# Patient Record
Sex: FEMALE | Race: WHITE | Employment: UNEMPLOYED | URBAN - METROPOLITAN AREA
[De-identification: names, ages, dates, MRNs, and addresses within clinical notes are randomized per-mention and may not be internally consistent; named-entity substitution may affect disease eponyms.]

---

## 2017-02-20 ENCOUNTER — OFFICE VISIT (OUTPATIENT)
Dept: ENDOCRINOLOGY | Age: 67
End: 2017-02-20

## 2017-02-20 VITALS
RESPIRATION RATE: 16 BRPM | BODY MASS INDEX: 13.21 KG/M2 | HEART RATE: 84 BPM | OXYGEN SATURATION: 100 % | WEIGHT: 82.2 LBS | DIASTOLIC BLOOD PRESSURE: 61 MMHG | SYSTOLIC BLOOD PRESSURE: 97 MMHG | HEIGHT: 66 IN

## 2017-02-20 DIAGNOSIS — E05.00 GRAVES DISEASE: ICD-10-CM

## 2017-02-20 DIAGNOSIS — E05.90 HYPERTHYROIDISM: Primary | ICD-10-CM

## 2017-02-20 PROCEDURE — 99213 OFFICE O/P EST LOW 20 MIN: CPT | Performed by: INTERNAL MEDICINE

## 2017-05-08 ENCOUNTER — OFFICE VISIT (OUTPATIENT)
Dept: ENDOCRINOLOGY | Age: 67
End: 2017-05-08

## 2017-05-08 VITALS
RESPIRATION RATE: 16 BRPM | SYSTOLIC BLOOD PRESSURE: 98 MMHG | BODY MASS INDEX: 13.89 KG/M2 | HEART RATE: 81 BPM | DIASTOLIC BLOOD PRESSURE: 61 MMHG | HEIGHT: 66 IN | WEIGHT: 86.4 LBS

## 2017-05-08 DIAGNOSIS — E05.90 HYPERTHYROIDISM: Primary | ICD-10-CM

## 2017-05-08 DIAGNOSIS — E05.00 GRAVES DISEASE: ICD-10-CM

## 2017-05-08 PROCEDURE — 99214 OFFICE O/P EST MOD 30 MIN: CPT | Performed by: INTERNAL MEDICINE

## 2017-05-08 RX ORDER — METHIMAZOLE 5 MG/1
TABLET ORAL
Qty: 30 TABLET | Refills: 4 | Status: SHIPPED | OUTPATIENT
Start: 2017-05-08 | End: 2017-05-11 | Stop reason: SDUPTHER

## 2017-05-11 RX ORDER — METHIMAZOLE 5 MG/1
TABLET ORAL
Qty: 90 TABLET | Refills: 1 | Status: SHIPPED | OUTPATIENT
Start: 2017-05-11 | End: 2017-05-17 | Stop reason: SDUPTHER

## 2017-05-17 RX ORDER — METHIMAZOLE 5 MG/1
TABLET ORAL
Qty: 90 TABLET | Refills: 1 | Status: SHIPPED | OUTPATIENT
Start: 2017-05-17 | End: 2018-09-22 | Stop reason: SDUPTHER

## 2017-08-09 ENCOUNTER — TELEPHONE (OUTPATIENT)
Dept: ENDOCRINOLOGY | Age: 67
End: 2017-08-09

## 2017-08-09 ENCOUNTER — OFFICE VISIT (OUTPATIENT)
Dept: ENDOCRINOLOGY | Age: 67
End: 2017-08-09

## 2017-08-09 VITALS
RESPIRATION RATE: 16 BRPM | DIASTOLIC BLOOD PRESSURE: 63 MMHG | BODY MASS INDEX: 15.04 KG/M2 | SYSTOLIC BLOOD PRESSURE: 92 MMHG | OXYGEN SATURATION: 96 % | HEIGHT: 66 IN | HEART RATE: 92 BPM | WEIGHT: 93.6 LBS

## 2017-08-09 DIAGNOSIS — E05.00 GRAVES DISEASE: ICD-10-CM

## 2017-08-09 DIAGNOSIS — E05.90 HYPERTHYROIDISM: Primary | ICD-10-CM

## 2017-08-09 PROCEDURE — 99214 OFFICE O/P EST MOD 30 MIN: CPT | Performed by: INTERNAL MEDICINE

## 2017-09-27 RX ORDER — METHIMAZOLE 5 MG/1
TABLET ORAL
Qty: 90 TABLET | Refills: 3 | Status: SHIPPED | OUTPATIENT
Start: 2017-09-27 | End: 2017-11-27 | Stop reason: SDUPTHER

## 2017-11-27 ENCOUNTER — OFFICE VISIT (OUTPATIENT)
Dept: ENDOCRINOLOGY | Age: 67
End: 2017-11-27

## 2017-11-27 VITALS
DIASTOLIC BLOOD PRESSURE: 62 MMHG | RESPIRATION RATE: 16 BRPM | BODY MASS INDEX: 18.99 KG/M2 | SYSTOLIC BLOOD PRESSURE: 102 MMHG | HEART RATE: 60 BPM | WEIGHT: 100.6 LBS | HEIGHT: 61 IN

## 2017-11-27 DIAGNOSIS — E05.00 GRAVES DISEASE: ICD-10-CM

## 2017-11-27 DIAGNOSIS — E05.90 HYPERTHYROIDISM: Primary | ICD-10-CM

## 2017-11-27 PROCEDURE — 99213 OFFICE O/P EST LOW 20 MIN: CPT | Performed by: INTERNAL MEDICINE

## 2018-03-26 ENCOUNTER — OFFICE VISIT (OUTPATIENT)
Dept: ENDOCRINOLOGY | Age: 68
End: 2018-03-26

## 2018-03-26 VITALS
DIASTOLIC BLOOD PRESSURE: 60 MMHG | BODY MASS INDEX: 20.5 KG/M2 | SYSTOLIC BLOOD PRESSURE: 100 MMHG | RESPIRATION RATE: 16 BRPM | HEART RATE: 62 BPM | HEIGHT: 61 IN | WEIGHT: 108.6 LBS

## 2018-03-26 DIAGNOSIS — E05.90 HYPERTHYROIDISM: Primary | ICD-10-CM

## 2018-03-26 DIAGNOSIS — E05.00 GRAVES DISEASE: ICD-10-CM

## 2018-03-26 PROCEDURE — 99213 OFFICE O/P EST LOW 20 MIN: CPT | Performed by: INTERNAL MEDICINE

## 2018-03-26 NOTE — PROGRESS NOTES
03/26/18 108 lb 9.6 oz (49.3 kg)   11/27/17 100 lb 9.6 oz (45.6 kg)   08/09/17 93 lb 9.6 oz (42.5 kg)       Constitutional: Well-developed, appears stated age, cooperative, in no acute distress, thin,   H/E/N/M/T:atraumatic, normocephalic, external ears, nose, lips normal without lesions  Eyes: Lids, lashes, conjunctivae and sclerae normal, No proptosis  Hand muscle wasting, deformity  Thyroid: gland size normal  Skin: No obvious rashes or lesions present. Psychiatric: Judgement and Insight:  judgement and insight appear normal  Neuro: Normal without focal findings, speech is normal normal, speech is spontaneous    Lab Review  Lab Results   Component Value Date    TSH 2.73 03/20/2018     No results found for: FREET4       24-hour radioactive iodine uptake is 25.8% which is towards upper  range of normal (normal 10-30/35%). Asymmetric enlargement right lobe of thyroid approximately 6-7 cm  in length. Asymmetric increased activity in right lobe of thyroid. Only faint  activity in thyroid isthmus and inferior aspect left lobe of  Thyroid. 2/16 Thyroid US   1. Solitary poorly defined nonspecific hypoechoic nodule in the  anterior superior aspect of the right thyroid lobe measuring 8 x 7  x 5 mm. 2. No evidence of additional thyroid nodules. 3. No evidence of thyromegaly. Reviewed ultrasound , nodule 8mm seen is ill defined, gland is heterogenous. Findings suggest Graves disease given + Ab, although scan showed uptake on right mainly, no nodule seen on USG, rare presentation for Grave's      Assessment: 1. Graves Disease: On tapazole 5 mg QOD , free level normal,TSH normal, may hold tapazole and assess for remission, discussed MURPHY. Given stable currently, continue same dose. TSI negative  3. Connective tissue disorder  4. Anxiety/depression  5. OCD  6. DDD  7. Thyroid Nodule: ill defined  8. Check cortisol given low BMI , difficulty gaining    Plan:     1. Tapazole  5mg QOD  2. TFT in 4 months

## 2018-07-31 ENCOUNTER — TELEPHONE (OUTPATIENT)
Dept: ENDOCRINOLOGY | Age: 68
End: 2018-07-31

## 2018-09-24 RX ORDER — METHIMAZOLE 5 MG/1
TABLET ORAL
Qty: 90 TABLET | Refills: 3 | Status: SHIPPED | OUTPATIENT
Start: 2018-09-24 | End: 2019-02-04

## 2018-10-08 ENCOUNTER — OFFICE VISIT (OUTPATIENT)
Dept: ENDOCRINOLOGY | Age: 68
End: 2018-10-08
Payer: COMMERCIAL

## 2018-10-08 VITALS
HEIGHT: 61 IN | WEIGHT: 108.2 LBS | OXYGEN SATURATION: 99 % | DIASTOLIC BLOOD PRESSURE: 59 MMHG | RESPIRATION RATE: 16 BRPM | BODY MASS INDEX: 20.43 KG/M2 | HEART RATE: 89 BPM | SYSTOLIC BLOOD PRESSURE: 90 MMHG

## 2018-10-08 DIAGNOSIS — E05.00 GRAVES DISEASE: ICD-10-CM

## 2018-10-08 DIAGNOSIS — E05.90 HYPERTHYROIDISM: Primary | ICD-10-CM

## 2018-10-08 PROCEDURE — 99213 OFFICE O/P EST LOW 20 MIN: CPT | Performed by: INTERNAL MEDICINE

## 2018-11-29 ENCOUNTER — APPOINTMENT (RX ONLY)
Dept: URBAN - METROPOLITAN AREA CLINIC 170 | Facility: CLINIC | Age: 68
Setting detail: DERMATOLOGY
End: 2018-11-29

## 2018-11-29 DIAGNOSIS — L81.4 OTHER MELANIN HYPERPIGMENTATION: ICD-10-CM

## 2018-11-29 DIAGNOSIS — D22 MELANOCYTIC NEVI: ICD-10-CM

## 2018-11-29 DIAGNOSIS — D18.0 HEMANGIOMA: ICD-10-CM

## 2018-11-29 DIAGNOSIS — L82.1 OTHER SEBORRHEIC KERATOSIS: ICD-10-CM

## 2018-11-29 PROBLEM — M32.10 SYSTEMIC LUPUS ERYTHEMATOSUS, ORGAN OR SYSTEM INVOLVEMENT UNSPECIFIED: Status: ACTIVE | Noted: 2018-11-29

## 2018-11-29 PROBLEM — D22.39 MELANOCYTIC NEVI OF OTHER PARTS OF FACE: Status: ACTIVE | Noted: 2018-11-29

## 2018-11-29 PROBLEM — D18.01 HEMANGIOMA OF SKIN AND SUBCUTANEOUS TISSUE: Status: ACTIVE | Noted: 2018-11-29

## 2018-11-29 PROBLEM — L29.8 OTHER PRURITUS: Status: ACTIVE | Noted: 2018-11-29

## 2018-11-29 PROBLEM — D22.5 MELANOCYTIC NEVI OF TRUNK: Status: ACTIVE | Noted: 2018-11-29

## 2018-11-29 PROBLEM — L85.3 XEROSIS CUTIS: Status: ACTIVE | Noted: 2018-11-29

## 2018-11-29 PROBLEM — E05.90 THYROTOXICOSIS, UNSPECIFIED WITHOUT THYROTOXIC CRISIS OR STORM: Status: ACTIVE | Noted: 2018-11-29

## 2018-11-29 PROCEDURE — ? COUNSELING

## 2018-11-29 PROCEDURE — 99202 OFFICE O/P NEW SF 15 MIN: CPT

## 2018-11-29 ASSESSMENT — LOCATION DETAILED DESCRIPTION DERM
LOCATION DETAILED: STERNAL NOTCH
LOCATION DETAILED: LEFT MEDIAL BREAST 10-11:00 REGION
LOCATION DETAILED: MIDDLE STERNUM
LOCATION DETAILED: LEFT FOREHEAD
LOCATION DETAILED: RIGHT LATERAL SUPERIOR CHEST

## 2018-11-29 ASSESSMENT — LOCATION ZONE DERM
LOCATION ZONE: FACE
LOCATION ZONE: TRUNK

## 2018-11-29 ASSESSMENT — LOCATION SIMPLE DESCRIPTION DERM
LOCATION SIMPLE: CHEST
LOCATION SIMPLE: LEFT BREAST
LOCATION SIMPLE: LEFT FOREHEAD

## 2018-11-29 NOTE — HPI: EVALUATION OF SKIN LESION(S)
Hpi Title: Evaluation of Skin Lesions
How Severe Are Your Spot(S)?: mild
Have Your Spot(S) Been Treated In The Past?: has not been treated
Additional History: Left lower leg has two scaly growths for about one year. Body lotion is not helping. \\nDark mole on left upper forehead noticed approximately 3 months. No changes.

## 2019-01-23 ENCOUNTER — HOSPITAL ENCOUNTER (OUTPATIENT)
Dept: ULTRASOUND IMAGING | Age: 69
Discharge: HOME | End: 2019-01-23
Payer: COMMERCIAL

## 2019-01-23 DIAGNOSIS — E05.90 HYPERTHYROIDISM: ICD-10-CM

## 2019-01-23 DIAGNOSIS — E05.00 GRAVES DISEASE: ICD-10-CM

## 2019-01-23 PROCEDURE — 76536 US EXAM OF HEAD AND NECK: CPT

## 2019-02-04 ENCOUNTER — OFFICE VISIT (OUTPATIENT)
Dept: ENDOCRINOLOGY | Age: 69
End: 2019-02-04
Payer: COMMERCIAL

## 2019-02-04 VITALS
WEIGHT: 102 LBS | HEIGHT: 61 IN | DIASTOLIC BLOOD PRESSURE: 61 MMHG | BODY MASS INDEX: 19.26 KG/M2 | SYSTOLIC BLOOD PRESSURE: 98 MMHG | RESPIRATION RATE: 16 BRPM | HEART RATE: 81 BPM

## 2019-02-04 DIAGNOSIS — E05.00 GRAVES DISEASE: ICD-10-CM

## 2019-02-04 DIAGNOSIS — E05.90 HYPERTHYROIDISM: Primary | ICD-10-CM

## 2019-02-04 DIAGNOSIS — E04.1 THYROID NODULE: ICD-10-CM

## 2019-02-04 PROCEDURE — 99214 OFFICE O/P EST MOD 30 MIN: CPT | Performed by: INTERNAL MEDICINE

## 2019-02-04 RX ORDER — ERGOCALCIFEROL 1.25 MG/1
CAPSULE ORAL
COMMUNITY
Start: 2019-01-18 | End: 2021-03-19 | Stop reason: ALTCHOICE

## 2019-02-04 RX ORDER — RISEDRONATE SODIUM 35 MG/1
TABLET, FILM COATED ORAL
COMMUNITY
Start: 2019-01-18 | End: 2019-06-05

## 2019-06-05 ENCOUNTER — OFFICE VISIT (OUTPATIENT)
Dept: ENDOCRINOLOGY | Age: 69
End: 2019-06-05
Payer: COMMERCIAL

## 2019-06-05 VITALS
HEIGHT: 61 IN | HEART RATE: 77 BPM | BODY MASS INDEX: 18.43 KG/M2 | RESPIRATION RATE: 16 BRPM | WEIGHT: 97.6 LBS | OXYGEN SATURATION: 100 % | DIASTOLIC BLOOD PRESSURE: 59 MMHG | SYSTOLIC BLOOD PRESSURE: 93 MMHG

## 2019-06-05 DIAGNOSIS — E05.90 HYPERTHYROIDISM: Primary | ICD-10-CM

## 2019-06-05 PROCEDURE — 99213 OFFICE O/P EST LOW 20 MIN: CPT | Performed by: INTERNAL MEDICINE

## 2019-06-05 NOTE — PROGRESS NOTES
Subjective:      64 y/o WF who is here for f/u for Graves disease. Interval History: none  Off tapazole    4/16: TSH 0.06 FT4 0.8 FT3 2.5    2/16 TSH <0.01 FT4 2.1 FT3 5.3 TBII 41 H     12/15 TSH 0.009 FT4 2.49  TPO Ab 85    3/14 TSH 1.42  9/14 TSH 1.92    Initial complaints: fatigue, muscle, joint pain, anxiety, no tremors,25lb weight loss over 3 years, she had decreased appetite  History of obstructive symptoms:  difficulty swallowing No, changes in voice/hoarseness  No.    History of radiation to patient's neck:   No  Recent iodine exposure:  10/15  Family history includes no thyroid abnormalities. Family history of thyroid cancer:  No    7/16 TSH 4.75 FT4 0.9 FT3 2.4 tapazole 5mg  10/16 TSH 0.35 FT4 1.0 FT3 2.9    5mg QOD  2/17 TSH 16 FT4 0.8 FT3 2.3    Now off tapazole    5/17 TSH 0.05 FT4 1.3 FT3 3.4  8/17 TSH 6.48 FT4 0.8 T3 pending Cortisol 14.8  Tapazole 5mg daily  11/17 TSH 3  FT4 1.0 FT3 2.6 Tapazole 5mg QOD  3/18 TSH 2.73 Free level normal   TSI negative  7/18 TSH 3.24  9/18  TSH 2.86   1/19 TSH 3.58  6.19 TSH 2.71    USG showed a nodule    USG 2/16  Solitary poorly defined nonspecific hypoechoic nodule in the   anterior superior aspect of the right thyroid lobe measuring 8 x 7   x 5 mm.      New PCP: Beth Jimenez    FH: + h/o autoimmune disease in family    Past Medical History:   Diagnosis Date    Autoimmune disease ([address]     Herniated disc     IBS (irritable bowel syndrome)     Lupus      Past Surgical History:   Procedure Laterality Date    SMALL INTESTINE SURGERY       Current Outpatient Medications   Medication Sig Dispense Refill    risedronate (ACTONEL) 35 MG tablet       vitamin D (ERGOCALCIFEROL) 46117 units CAPS capsule       venlafaxine (EFFEXOR XR) 37.5 MG extended release capsule Take 37.5 mg by mouth daily      ARIPiprazole (ABILIFY) 10 MG tablet Take 10 mg by mouth daily      diazepam (VALIUM) 5 MG tablet Take 5 mg by mouth 1/2 tablet twice daily       No current facility-administered medications for this visit. Review of Systems  Scanned, reviewed  5lb loss     Objective:      LMP  (LMP Unknown)   Wt Readings from Last 3 Encounters:   02/04/19 102 lb (46.3 kg)   10/08/18 108 lb 3.2 oz (49.1 kg)   03/26/18 108 lb 9.6 oz (49.3 kg)       Constitutional: Well-developed, appears stated age, cooperative, in no acute distress, thin,   H/E/N/M/T:atraumatic, normocephalic, external ears, nose, lips normal without lesions  Eyes: Lids, lashes, conjunctivae and sclerae normal, No proptosis  Hand muscle wasting, deformity  Thyroid: gland size normal  Skin: No obvious rashes or lesions present. Psychiatric: Judgement and Insight:  judgement and insight appear normal  Neuro: Normal without focal findings, speech is normal normal, speech is spontaneous    Lab Review  Lab Results   Component Value Date    TSH 3.24 07/25/2018     No results found for: FREET4       24-hour radioactive iodine uptake is 25.8% which is towards upper  range of normal (normal 10-30/35%). Asymmetric enlargement right lobe of thyroid approximately 6-7 cm  in length. Asymmetric increased activity in right lobe of thyroid. Only faint  activity in thyroid isthmus and inferior aspect left lobe of  Thyroid. 2/16 Thyroid US   1. Solitary poorly defined nonspecific hypoechoic nodule in the  anterior superior aspect of the right thyroid lobe measuring 8 x 7  x 5 mm. 2. No evidence of additional thyroid nodules. 3. No evidence of thyromegaly. Reviewed ultrasound , nodule 8mm seen is ill defined, gland is heterogenous. Findings suggest Graves disease given + Ab, although scan showed uptake on right mainly, no nodule seen on USG, rare presentation for Grave's      Assessment: 1. Graves Disease:  Off tapazole, TSH normal, in  remission, will monitor  3. Connective tissue disorder  4. Anxiety/depression  5. OCD  6. DDD  7. Thyroid Nodule: ill defined, none seen on repeat USG    Plan:     1. Hold tapazole  2. TFT in 6 months

## 2020-01-07 ENCOUNTER — TELEPHONE (OUTPATIENT)
Dept: ENDOCRINOLOGY | Age: 70
End: 2020-01-07

## 2020-02-17 ENCOUNTER — TELEPHONE (OUTPATIENT)
Dept: ENDOCRINOLOGY | Age: 70
End: 2020-02-17

## 2020-02-17 NOTE — TELEPHONE ENCOUNTER
Patient's  would like a return phone call from Dr Lilia Hyatt.  Patient had some test done and patient's  is wondering if Dr. Lilia Hyatt ordered TSH sensitivity test. Please return call

## 2020-02-17 NOTE — TELEPHONE ENCOUNTER
Called patient, spoke to , clarified TSH high sensitive vs TSH test as will provide same info in her case. Discussed that  usually reviews results and cancels appointments. Advised in order to provide complete and adequate evaluation, need to see patient. If they would prefer, can see PCP and have labs done every 3 months and see me yearly or prn. They will follow with PCP and see me prn.

## 2020-02-17 NOTE — TELEPHONE ENCOUNTER
Please advise  that the TSH was resulted. We can give results and mail if needed, will discuss at visit. LOV 6/19.

## 2020-08-20 ENCOUNTER — TELEPHONE (OUTPATIENT)
Dept: ENDOCRINOLOGY | Age: 70
End: 2020-08-20

## 2020-08-25 ENCOUNTER — TELEPHONE (OUTPATIENT)
Dept: ENDOCRINOLOGY | Age: 70
End: 2020-08-25

## 2020-12-04 ENCOUNTER — PATIENT MESSAGE (OUTPATIENT)
Dept: ENDOCRINOLOGY | Age: 70
End: 2020-12-04

## 2020-12-07 RX ORDER — METHIMAZOLE 5 MG/1
TABLET ORAL
Qty: 90 TABLET | Refills: 1 | Status: SHIPPED | OUTPATIENT
Start: 2020-12-07 | End: 2021-07-02 | Stop reason: SDUPTHER

## 2020-12-28 ENCOUNTER — APPOINTMENT (RX ONLY)
Dept: URBAN - METROPOLITAN AREA CLINIC 170 | Facility: CLINIC | Age: 70
Setting detail: DERMATOLOGY
End: 2020-12-28

## 2020-12-28 DIAGNOSIS — D18.0 HEMANGIOMA: ICD-10-CM

## 2020-12-28 DIAGNOSIS — L85.3 XEROSIS CUTIS: ICD-10-CM

## 2020-12-28 DIAGNOSIS — L81.4 OTHER MELANIN HYPERPIGMENTATION: ICD-10-CM

## 2020-12-28 DIAGNOSIS — L82.1 OTHER SEBORRHEIC KERATOSIS: ICD-10-CM

## 2020-12-28 DIAGNOSIS — D22 MELANOCYTIC NEVI: ICD-10-CM

## 2020-12-28 PROBLEM — D22.5 MELANOCYTIC NEVI OF TRUNK: Status: ACTIVE | Noted: 2020-12-28

## 2020-12-28 PROBLEM — D18.01 HEMANGIOMA OF SKIN AND SUBCUTANEOUS TISSUE: Status: ACTIVE | Noted: 2020-12-28

## 2020-12-28 PROCEDURE — ? FULL BODY SKIN EXAM

## 2020-12-28 PROCEDURE — 99213 OFFICE O/P EST LOW 20 MIN: CPT

## 2020-12-28 PROCEDURE — ? ADDITIONAL NOTES

## 2020-12-28 PROCEDURE — ? COUNSELING

## 2020-12-28 ASSESSMENT — LOCATION SIMPLE DESCRIPTION DERM
LOCATION SIMPLE: LEFT PRETIBIAL REGION
LOCATION SIMPLE: RIGHT PRETIBIAL REGION
LOCATION SIMPLE: CHEST
LOCATION SIMPLE: LEFT BREAST

## 2020-12-28 ASSESSMENT — LOCATION DETAILED DESCRIPTION DERM
LOCATION DETAILED: STERNAL NOTCH
LOCATION DETAILED: RIGHT PROXIMAL PRETIBIAL REGION
LOCATION DETAILED: LEFT MEDIAL BREAST 10-11:00 REGION
LOCATION DETAILED: MIDDLE STERNUM
LOCATION DETAILED: LEFT PROXIMAL PRETIBIAL REGION
LOCATION DETAILED: RIGHT LATERAL SUPERIOR CHEST

## 2020-12-28 ASSESSMENT — LOCATION ZONE DERM
LOCATION ZONE: LEG
LOCATION ZONE: TRUNK

## 2020-12-28 NOTE — HPI: EVALUATION OF SKIN LESION(S)
Dr Robin Gongora at bedside for pt evaluation         Lucretia George, RN  09/23/19 [id number]
What Type Of Note Output Would You Prefer (Optional)?: Bullet Format
Hpi Title: Evaluation of Skin Lesions
How Severe Are Your Spot(S)?: mild

## 2020-12-28 NOTE — PROCEDURE: MIPS QUALITY
Detail Level: Detailed
Quality 47: Advance Care Plan: Advance Care Planning discussed and documented in the medical record; patient did not wish or was not able to name a surrogate decision maker or provide an advance care plan.
Quality 110: Preventive Care And Screening: Influenza Immunization: Influenza Immunization Administered during Influenza season
Quality 226: Preventive Care And Screening: Tobacco Use: Screening And Cessation Intervention: Patient screened for tobacco use and is an ex/non-smoker
Quality 130: Documentation Of Current Medications In The Medical Record: Current Medications Documented
Quality 431: Preventive Care And Screening: Unhealthy Alcohol Use - Screening: Patient screened for unhealthy alcohol use using a single question and scores less than 2 times per year
Quality 111:Pneumonia Vaccination Status For Older Adults: Pneumococcal Vaccination Previously Received

## 2020-12-28 NOTE — HPI: RASH
What Type Of Note Output Would You Prefer (Optional)?: Bullet Format
How Severe Is Your Rash?: mild
Is This A New Presentation, Or A Follow-Up?: Rash
Additional History: Rash is better, no treatment has been done.

## 2021-03-19 ENCOUNTER — VIRTUAL VISIT (OUTPATIENT)
Dept: ENDOCRINOLOGY | Age: 71
End: 2021-03-19
Payer: MEDICARE

## 2021-03-19 DIAGNOSIS — R63.4 WEIGHT LOSS: ICD-10-CM

## 2021-03-19 DIAGNOSIS — E05.00 GRAVES DISEASE: Primary | ICD-10-CM

## 2021-03-19 PROCEDURE — G8427 DOCREV CUR MEDS BY ELIG CLIN: HCPCS | Performed by: INTERNAL MEDICINE

## 2021-03-19 PROCEDURE — G8400 PT W/DXA NO RESULTS DOC: HCPCS | Performed by: INTERNAL MEDICINE

## 2021-03-19 PROCEDURE — 99214 OFFICE O/P EST MOD 30 MIN: CPT | Performed by: INTERNAL MEDICINE

## 2021-03-19 PROCEDURE — 3017F COLORECTAL CA SCREEN DOC REV: CPT | Performed by: INTERNAL MEDICINE

## 2021-03-19 PROCEDURE — 1090F PRES/ABSN URINE INCON ASSESS: CPT | Performed by: INTERNAL MEDICINE

## 2021-03-19 PROCEDURE — 4040F PNEUMOC VAC/ADMIN/RCVD: CPT | Performed by: INTERNAL MEDICINE

## 2021-03-19 PROCEDURE — 1123F ACP DISCUSS/DSCN MKR DOCD: CPT | Performed by: INTERNAL MEDICINE

## 2021-03-19 NOTE — PROGRESS NOTES
Subjective:      74 y/o WF who is here for f/u for Graves disease. Pursuant to the emergency declaration under the [facility][note 3][facility], [facility] waiver authority and the [employer] and [employer] Act, this Virtual  Visit was conducted, with patient's consent, to reduce the patient's risk of exposure to COVID-19 and provide continuity of care for an established patient. Patient was at home. Provider was at home.  also at visit  Services were provided through a video synchronous discussion virtually to substitute for in-person clinic visit. Interval History:   She is taking tapazole QOD  Weight increasing    4/16: TSH 0.06 FT4 0.8 FT3 2.5    2/16 TSH <0.01 FT4 2.1 FT3 5.3 TBII 41 H     12/15 TSH 0.009 FT4 2.49  TPO Ab 85    3/14 TSH 1.42  9/14 TSH 1.92    Initial complaints: fatigue, muscle, joint pain, anxiety, no tremors,25lb weight loss over 3 years, she had decreased appetite  History of obstructive symptoms:  difficulty swallowing No, changes in voice/hoarseness  No.    History of radiation to patient's neck:   No  Recent iodine exposure:  10/15  Family history includes no thyroid abnormalities. Family history of thyroid cancer:  No    7/16 TSH 4.75 FT4 0.9 FT3 2.4 tapazole 5mg  10/16 TSH 0.35 FT4 1.0 FT3 2.9    5mg QOD  2/17 TSH 16 FT4 0.8 FT3 2.3    Now off tapazole    5/17 TSH 0.05 FT4 1.3 FT3 3.4  8/17 TSH 6.48 FT4 0.8 T3 pending Cortisol 14.8  Tapazole 5mg daily  11/17 TSH 3  FT4 1.0 FT3 2.6 Tapazole 5mg QOD  3/18 TSH 2.73 Free level normal   TSI negative  7/18 TSH 3.24  9/18  TSH 2.86   1/19 TSH 3.58  6.19 TSH 2.71  8/20 TSH 0.81 FT4 0.98  12/20 TSH 0.02  2/21 TSH 13.33  Tapazole 5mg daily    USG showed a nodule    USG 2/16  Solitary poorly defined nonspecific hypoechoic nodule in the   anterior superior aspect of the right thyroid lobe measuring 8 x 7   x 5 mm.      New PCP: Sienna Lazcano    She had weight loss  Now improving  Gaining 1.5 lb per week    FH: + h/o autoimmune disease in family    Past Medical History:   Diagnosis Date    [facility] [address] [address]     Herniated disc     IBS (irritable bowel syndrome)     Lupus (HCC)      Past Surgical History:   Procedure Laterality Date    SMALL INTESTINE SURGERY       Current Outpatient Medications   Medication Sig Dispense Refill    methIMAzole (TAPAZOLE) 5 MG tablet TAKE 1 TABLET DAILY 90 tablet 1    venlafaxine (EFFEXOR XR) 37.5 MG extended release capsule Take 37.5 mg by mouth daily      diazepam (VALIUM) 5 MG tablet Take 5 mg by mouth 1/2 tablet twice daily       No current facility-administered medications for this visit. Review of Systems  Constitutional: + for weight gain and malaise/fatigue. Negative for fever and chills. HENT: Negative for hearing loss, ear pain, nosebleeds, neck pain and tinnitus. Eyes: Negative for blurred vision. Negative for double vision, photophobia and pain. Respiratory: Negative for cough and sputum production. Cardiovascular: Negative for chest pain, palpitations and leg swelling. Gastrointestinal: Negative for nausea, vomiting and abdominal pain. Genitourinary: Negative for dysuria, urgency and frequency. Musculoskeletal: Negative for back pain. No joint pain +chronic pain  Skin: Negative for itching and rash. Neurological: Negative for dizziness. Negative for tingling, tremors, focal weakness and headaches. Endo/Heme/Allergies: see HPI  Psychiatric/Behavioral: Negative for depression and substance abuse.             PHYSICAL EXAMINATION:  [ INSTRUCTIONS:  \"[x]\" Indicates a positive item  \"[]\" Indicates a negative item  -- DELETE ALL ITEMS NOT EXAMINED]  Vital Signs: (As obtained by patient/caregiver or practitioner observation)    Blood pressure-  Heart rate-    Respiratory rate- 14   Temperature-  Pulse oximetry-     Constitutional Appears well-developed and well-nourished No apparent distress        Mental status  Alert and awake  Oriented to person/place/time  Able to follow commands      Eyes:  EOM    [x]  Normal    Sclera  [x]  Normal           Discharge [x]  None visible      HENT:   [x] Normocephalic, atraumatic. [x] Mouth/Throat: Mucous membranes are moist.     External Ears [x] Normal  no discharge    Neck: [x] No visualized mass  no swelling    Pulmonary/Chest: [x] Respiratory effort normal.  [x] No visualized signs of difficulty breathing or respiratory distress             Musculoskeletal:   [x] Normal gait with no signs of ataxia         [x] Normal range of motion of neck          Head and neck stable, appears normal ROM, strength good    Neurological:        [x] No Facial Asymmetry (Cranial nerve 7 motor function) (limited exam to video visit)          [x] No gaze palsy                Skin:        [x] No significant exanthematous lesions or discoloration noted on facial skin                 Psychiatric:       [x] Normal Affect [x] No Hallucinations            Other pertinent observable physical exam findings-     Due to this being a TeleHealth encounter, evaluation of the following organ systems is limited: Vitals/Constitutional/EENT/Resp/CV/GI//MS/Neuro/Skin/Heme-Lymph-Imm. Services were provided through a video synchronous discussion virtually to substitute for in-person clinic visit. Lab Review  Lab Results   Component Value Date    TSH 3.800 02/10/2020     No results found for: FREET4       24-hour radioactive iodine uptake is 25.8% which is towards upper  range of normal (normal 10-30/35%). Asymmetric enlargement right lobe of thyroid approximately 6-7 cm  in length. Asymmetric increased activity in right lobe of thyroid. Only faint  activity in thyroid isthmus and inferior aspect left lobe of  Thyroid. 2/16 Thyroid US   1. Solitary poorly defined nonspecific hypoechoic nodule in the  anterior superior aspect of the right thyroid lobe measuring 8 x 7  x 5 mm.   2. No evidence of additional thyroid nodules. 3. No evidence of thyromegaly. Reviewed ultrasound , nodule 8mm seen is ill defined, gland is heterogenous. Findings suggest Graves disease given + Ab, although scan showed uptake on right mainly, no nodule seen on USG, rare presentation for Grave's      Assessment: 1. Graves Disease: On low dose tapazole, gaining weight now. Will check labs in 6 weeks, f/u in 3 months. Discussed MURPHY, she would like to avoid  3. Connective tissue disorder  4. Anxiety/depression  5. OCD  6. DDD  7. Thyroid Nodule: ill defined, none seen on repeat USG  8. Weight loss: Cortisol has been checked in the past normal, recheck    Plan:     1. Tapazole 5mg QOD  2.  TFT in 6 weeks

## 2021-05-03 ENCOUNTER — APPOINTMENT (RX ONLY)
Dept: URBAN - METROPOLITAN AREA CLINIC 170 | Facility: CLINIC | Age: 71
Setting detail: DERMATOLOGY
End: 2021-05-03

## 2021-05-03 DIAGNOSIS — B07.8 OTHER VIRAL WARTS: ICD-10-CM

## 2021-05-03 PROBLEM — D48.5 NEOPLASM OF UNCERTAIN BEHAVIOR OF SKIN: Status: ACTIVE | Noted: 2021-05-03

## 2021-05-03 PROCEDURE — ? EDUCATIONAL RESOURCES PROVIDED

## 2021-05-03 PROCEDURE — 11102 TANGNTL BX SKIN SINGLE LES: CPT

## 2021-05-03 PROCEDURE — ? BIOPSY BY SHAVE METHOD

## 2021-05-03 PROCEDURE — ? ADDITIONAL NOTES

## 2021-05-03 PROCEDURE — ? IN-HOUSE DISPENSING PHARMACY

## 2021-05-03 ASSESSMENT — LOCATION ZONE DERM: LOCATION ZONE: TOE

## 2021-05-03 ASSESSMENT — LOCATION SIMPLE DESCRIPTION DERM: LOCATION SIMPLE: PLANTAR SURFACE OF LEFT 1ST TOE

## 2021-05-03 ASSESSMENT — LOCATION DETAILED DESCRIPTION DERM: LOCATION DETAILED: LEFT MEDIAL PLANTAR 1ST TOE

## 2021-05-03 NOTE — HPI: SKIN LESION
What Type Of Note Output Would You Prefer (Optional)?: Bullet Format
How Severe Is Your Skin Lesion?: severe
Has Your Skin Lesion Been Treated?: been treated
Is This A New Presentation, Or A Follow-Up?: Growth
Additional History: Warty like growth

## 2021-05-03 NOTE — PROCEDURE: IN-HOUSE DISPENSING PHARMACY
Product 40 Price/Unit (In Dollars): 0
Product 51 Unit Type: mg
Detail Level: Zone
Send Charges To Patient Encounter: Yes
Product 2 Amount/Unit (Numbers Only): 1
Product 1 Price/Unit (In Dollars): 65.00
Product 1 Refills: 2
Name Of Product 2: Wartstick
Product 2 Application Directions: QD
Product 2 Unit Type: bottle(s)
Name Of Product 1: Tretinoin

## 2021-07-02 ENCOUNTER — VIRTUAL VISIT (OUTPATIENT)
Dept: ENDOCRINOLOGY | Age: 71
End: 2021-07-02
Payer: MEDICARE

## 2021-07-02 DIAGNOSIS — E05.90 HYPERTHYROIDISM: Primary | ICD-10-CM

## 2021-07-02 PROCEDURE — 1123F ACP DISCUSS/DSCN MKR DOCD: CPT | Performed by: INTERNAL MEDICINE

## 2021-07-02 PROCEDURE — 1090F PRES/ABSN URINE INCON ASSESS: CPT | Performed by: INTERNAL MEDICINE

## 2021-07-02 PROCEDURE — 4040F PNEUMOC VAC/ADMIN/RCVD: CPT | Performed by: INTERNAL MEDICINE

## 2021-07-02 PROCEDURE — 3017F COLORECTAL CA SCREEN DOC REV: CPT | Performed by: INTERNAL MEDICINE

## 2021-07-02 PROCEDURE — G8427 DOCREV CUR MEDS BY ELIG CLIN: HCPCS | Performed by: INTERNAL MEDICINE

## 2021-07-02 PROCEDURE — 99214 OFFICE O/P EST MOD 30 MIN: CPT | Performed by: INTERNAL MEDICINE

## 2021-07-02 PROCEDURE — G8400 PT W/DXA NO RESULTS DOC: HCPCS | Performed by: INTERNAL MEDICINE

## 2021-07-02 RX ORDER — METHIMAZOLE 5 MG/1
TABLET ORAL
Qty: 45 TABLET | Refills: 2 | Status: SHIPPED | OUTPATIENT
Start: 2021-07-02 | End: 2022-04-11 | Stop reason: SDUPTHER

## 2021-07-02 NOTE — PROGRESS NOTES
Subjective:      74 y/o WF who is here for f/u for Graves disease. Pursuant to the emergency declaration under the [facility], [facility] waiver authority and the [employer] and [employer] Act, this Virtual  Visit was conducted, with patient's consent, to reduce the patient's risk of exposure to COVID-19 and provide continuity of care for an established patient. Patient was at home. Provider was at home.  also at visit  Services were provided through a video synchronous discussion virtually to substitute for in-person clinic visit. Interval History:   She is taking tapazole QOD  Weight increasing 2lb gain    4/16: TSH 0.06 FT4 0.8 FT3 2.5    2/16 TSH <0.01 FT4 2.1 FT3 5.3 TBII 41 H     12/15 TSH 0.009 FT4 2.49  TPO Ab 85    3/14 TSH 1.42  9/14 TSH 1.92    Initial complaints: fatigue, muscle, joint pain, anxiety, no tremors,25lb weight loss over 3 years, she had decreased appetite  History of obstructive symptoms:  difficulty swallowing No, changes in voice/hoarseness  No.    History of radiation to patient's neck:   No  Recent iodine exposure:  10/15  Family history includes no thyroid abnormalities. Family history of thyroid cancer:  No    7/16 TSH 4.75 FT4 0.9 FT3 2.4 tapazole 5mg  10/16 TSH 0.35 FT4 1.0 FT3 2.9    5mg QOD  2/17 TSH 16 FT4 0.8 FT3 2.3    Now off tapazole    5/17 TSH 0.05 FT4 1.3 FT3 3.4  8/17 TSH 6.48 FT4 0.8 T3 pending Cortisol 14.8  Tapazole 5mg daily  11/17 TSH 3  FT4 1.0 FT3 2.6 Tapazole 5mg QOD  3/18 TSH 2.73 Free level normal   TSI negative  7/18 TSH 3.24  9/18  TSH 2.86   1/19 TSH 3.58  6.19 TSH 2.71  8/20 TSH 0.81 FT4 0.98  12/20 TSH 0.02  2/21 TSH 13.33  Tapazole 5mg daily  6/21 TSH  2.5 FT3 1.9 FT4  0.92    USG showed a nodule    USG 2/16  Solitary poorly defined nonspecific hypoechoic nodule in the   anterior superior aspect of the right thyroid lobe measuring 8 x 7   x 5 mm.      New PCP: Adriana Acosta, Abimael Berry    She had weight loss  Now improving  Gaining 1.5 lb per week    FH: + h/o autoimmune disease in family    Past Medical History:   Diagnosis Date    Autoimmune disease ([address]     Herniated disc     IBS (irritable bowel syndrome)     Lupus (HCC)      Past Surgical History:   Procedure Laterality Date    SMALL INTESTINE SURGERY       Current Outpatient Medications   Medication Sig Dispense Refill    methIMAzole (TAPAZOLE) 5 MG tablet TAKE 1 TABLET DAILY (Patient taking differently: TAKE 1 TABLET every other day) 90 tablet 1    diazepam (VALIUM) 5 MG tablet Take 5 mg by mouth 1/2 tablet twice daily       No current facility-administered medications for this visit. Review of Systems  Constitutional: + for weight gain and malaise/fatigue. Negative for fever and chills. HENT: Negative for hearing loss, ear pain, nosebleeds, neck pain and tinnitus. Eyes: Negative for blurred vision. Negative for double vision, photophobia and pain. Respiratory: Negative for cough and sputum production. Cardiovascular: Negative for chest pain, palpitations and leg swelling. Gastrointestinal: Negative for nausea, vomiting and abdominal pain. Genitourinary: Negative for dysuria, urgency and frequency. Musculoskeletal: Negative for back pain. No joint pain +chronic pain  Skin: Negative for itching and rash. Neurological: Negative for dizziness. Negative for tingling, tremors, focal weakness and headaches. Endo/Heme/Allergies: see HPI  Psychiatric/Behavioral: Negative for depression and substance abuse.             PHYSICAL EXAMINATION:  [ INSTRUCTIONS:  \"[x]\" Indicates a positive item  \"[]\" Indicates a negative item  -- DELETE ALL ITEMS NOT EXAMINED]  Vital Signs: (As obtained by patient/caregiver or practitioner observation)    Blood pressure-  Heart rate-    Respiratory rate- 14   Temperature-  Pulse oximetry-     Constitutional Appears well-developed and well-nourished No apparent distress Mental status  Alert and awake  Oriented to person/place/time  Able to follow commands      Eyes:  EOM    [x]  Normal    Sclera  [x]  Normal           Discharge [x]  None visible      HENT:   [x] Normocephalic, atraumatic. [x] Mouth/Throat: Mucous membranes are moist.     External Ears [x] Normal  no discharge    Neck: [x] No visualized mass  no swelling    Pulmonary/Chest: [x] Respiratory effort normal.  [x] No visualized signs of difficulty breathing or respiratory distress             Musculoskeletal:   [x] Normal gait with no signs of ataxia         [x] Normal range of motion of neck          Head and neck stable, appears normal ROM, strength good    Neurological:        [x] No Facial Asymmetry (Cranial nerve 7 motor function) (limited exam to video visit)          [x] No gaze palsy                Skin:        [x] No significant exanthematous lesions or discoloration noted on facial skin                 Psychiatric:       [x] Normal Affect [x] No Hallucinations            Other pertinent observable physical exam findings-     Due to this being a TeleHealth encounter, evaluation of the following organ systems is limited: Vitals/Constitutional/EENT/Resp/CV/GI//MS/Neuro/Skin/Heme-Lymph-Imm. Services were provided through a video synchronous discussion virtually to substitute for in-person clinic visit. Lab Review  Lab Results   Component Value Date    TSH 2.590 06/18/2021     No results found for: Torres Jordan       24-hour radioactive iodine uptake is 25.8% which is towards upper  range of normal (normal 10-30/35%). Asymmetric enlargement right lobe of thyroid approximately 6-7 cm  in length. Asymmetric increased activity in right lobe of thyroid. Only faint  activity in thyroid isthmus and inferior aspect left lobe of  Thyroid. 2/16 Thyroid US   1. Solitary poorly defined nonspecific hypoechoic nodule in the  anterior superior aspect of the right thyroid lobe measuring 8 x 7  x 5 mm.   2. No evidence of additional thyroid nodules. 3. No evidence of thyromegaly. Reviewed ultrasound , nodule 8mm seen is ill defined, gland is heterogenous. Findings suggest Graves disease given + Ab, although scan showed uptake on right mainly, no nodule seen on USG, rare presentation for Grave's      Assessment: 1. Graves Disease: On low dose tapazole, gaining weight now. Thyroid level is stable, continue same dose  Discussed MURPHY, she would like to avoid  3. Connective tissue disorder  4. Anxiety/depression  5. OCD  6. DDD  7. Thyroid Nodule: ill defined, none seen on repeat USG  8. Weight loss: Cortisol has been checked in the past normal, recheck    Plan:     1. Tapazole 5mg QOD  2.  TFT in 4 months

## 2022-03-01 DIAGNOSIS — E05.90 HYPERTHYROIDISM: Primary | ICD-10-CM

## 2022-04-11 ENCOUNTER — PATIENT MESSAGE (OUTPATIENT)
Dept: ENDOCRINOLOGY | Age: 72
End: 2022-04-11

## 2022-04-11 DIAGNOSIS — E05.90 HYPERTHYROIDISM: ICD-10-CM

## 2022-04-11 RX ORDER — METHIMAZOLE 5 MG/1
TABLET ORAL
Qty: 45 TABLET | Refills: 2 | Status: SHIPPED | OUTPATIENT
Start: 2022-04-11

## 2022-04-13 ENCOUNTER — TELEPHONE (OUTPATIENT)
Dept: ENDOCRINOLOGY | Age: 72
End: 2022-04-13

## 2022-04-13 RX ORDER — METHIMAZOLE 5 MG/1
5 TABLET ORAL
COMMUNITY
End: 2022-04-13 | Stop reason: SDUPTHER

## 2022-04-13 RX ORDER — METHIMAZOLE 5 MG/1
5 TABLET ORAL
Qty: 45 TABLET | Refills: 0 | Status: SHIPPED | OUTPATIENT
Start: 2022-04-13

## 2022-08-30 LAB
T3 FREE: 2.2 PG/ML (ref 2–4.4)
T4 FREE: 1.08 NG/DL (ref 0.82–1.77)
T4 FREE: NORMAL NG/DL
TSH SERPL DL<=0.05 MIU/L-ACNC: 4.53 UIU/ML (ref 0.45–4.5)

## 2022-09-07 ENCOUNTER — PATIENT MESSAGE (OUTPATIENT)
Dept: ENDOCRINOLOGY | Age: 72
End: 2022-09-07

## 2022-09-07 NOTE — TELEPHONE ENCOUNTER
From: Kerry Amaya  To: Dr. Arthuro Favre: 9/7/2022 11:52 AM EDT  Subject: This for Errol Arias in Customer Service? I tried to leave this message in the [facility] Customer service area, but it only gives the option of some [facility] billing system. Errol Arias: I returned your call, but just like the last 3xs I called you back (2xs put on hold and one where left a message for you to return my call and you did not), I called this morning and listen to the on hold music for 20 minutes and no one ever picks up the line. It's 11:45 AM on Wednesday September 7th, 2022. I consider my time valuable and there's only so much I can do. Since the office staff is not available, you'll have to call me back and if I'm by the phone, I'll answer. But it is fruitless for me to call you.

## 2022-09-07 NOTE — TELEPHONE ENCOUNTER
From: Valentin Fernandez  To: Dr. Polk Spark: 9/7/2022 11:55 AM EDT  Subject: follow up to last message    Krystyna Patel: Someone finally answered. The asked for your last name. You've neve left it, so I couldn't answer. She said if this was for Krystyna Patel the , she was not in today.

## 2022-12-27 DIAGNOSIS — E05.90 HYPERTHYROIDISM: ICD-10-CM

## 2022-12-28 RX ORDER — METHIMAZOLE 5 MG/1
TABLET ORAL
Qty: 45 TABLET | Refills: 2 | OUTPATIENT
Start: 2022-12-28